# Patient Record
Sex: FEMALE | Race: OTHER
[De-identification: names, ages, dates, MRNs, and addresses within clinical notes are randomized per-mention and may not be internally consistent; named-entity substitution may affect disease eponyms.]

---

## 2018-06-24 ENCOUNTER — HOSPITAL ENCOUNTER (EMERGENCY)
Dept: HOSPITAL 74 - JER | Age: 58
Discharge: HOME | End: 2018-06-24
Payer: COMMERCIAL

## 2018-06-24 VITALS — SYSTOLIC BLOOD PRESSURE: 155 MMHG | DIASTOLIC BLOOD PRESSURE: 65 MMHG | TEMPERATURE: 98.2 F | HEART RATE: 66 BPM

## 2018-06-24 VITALS — BODY MASS INDEX: 31.3 KG/M2

## 2018-06-24 DIAGNOSIS — I10: ICD-10-CM

## 2018-06-24 DIAGNOSIS — R55: Primary | ICD-10-CM

## 2018-06-24 LAB
ALBUMIN SERPL-MCNC: 3.8 G/DL (ref 3.4–5)
ALP SERPL-CCNC: 110 U/L (ref 45–117)
ALT SERPL-CCNC: 83 U/L (ref 12–78)
ANION GAP SERPL CALC-SCNC: 9 MMOL/L (ref 8–16)
APPEARANCE UR: CLEAR
AST SERPL-CCNC: 25 U/L (ref 15–37)
BASOPHILS # BLD: 0.7 % (ref 0–2)
BILIRUB SERPL-MCNC: 0.4 MG/DL (ref 0.2–1)
BILIRUB UR STRIP.AUTO-MCNC: NEGATIVE MG/DL
BUN SERPL-MCNC: 13 MG/DL (ref 7–18)
CALCIUM SERPL-MCNC: 8.4 MG/DL (ref 8.5–10.1)
CHLORIDE SERPL-SCNC: 98 MMOL/L (ref 98–107)
CO2 SERPL-SCNC: 27 MMOL/L (ref 21–32)
COLOR UR: COLORLESS
CREAT SERPL-MCNC: 0.5 MG/DL (ref 0.55–1.02)
DEPRECATED RDW RBC AUTO: 13.7 % (ref 11.6–15.6)
EOSINOPHIL # BLD: 0.1 % (ref 0–4.5)
EPITH CASTS URNS QL MICRO: (no result) /HPF
GLUCOSE SERPL-MCNC: 130 MG/DL (ref 74–106)
HCT VFR BLD CALC: 41.1 % (ref 32.4–45.2)
HGB BLD-MCNC: 13.5 GM/DL (ref 10.7–15.3)
INR BLD: 1.05 (ref 0.82–1.09)
KETONES UR QL STRIP: NEGATIVE
LEUKOCYTE ESTERASE UR QL STRIP.AUTO: NEGATIVE
LYMPHOCYTES # BLD: 13 % (ref 8–40)
MAGNESIUM SERPL-MCNC: 1.6 MG/DL (ref 1.8–2.4)
MCH RBC QN AUTO: 27.5 PG (ref 25.7–33.7)
MCHC RBC AUTO-ENTMCNC: 32.9 G/DL (ref 32–36)
MCV RBC: 83.4 FL (ref 80–96)
MONOCYTES # BLD AUTO: 4.1 % (ref 3.8–10.2)
NEUTROPHILS # BLD: 82.1 % (ref 42.8–82.8)
NITRITE UR QL STRIP: NEGATIVE
PH UR: 6 [PH] (ref 5–8)
PLATELET # BLD AUTO: 283 K/MM3 (ref 134–434)
PMV BLD: 8.3 FL (ref 7.5–11.1)
POTASSIUM SERPLBLD-SCNC: 3.4 MMOL/L (ref 3.5–5.1)
PROT SERPL-MCNC: 7.1 G/DL (ref 6.4–8.2)
PROT UR QL STRIP: NEGATIVE
PROT UR QL STRIP: NEGATIVE
PT PNL PPP: 11.9 SEC (ref 9.7–13)
RBC # BLD AUTO: 4.92 M/MM3 (ref 3.6–5.2)
SODIUM SERPL-SCNC: 134 MMOL/L (ref 136–145)
SP GR UR: 1 (ref 1–1.03)
UROBILINOGEN UR STRIP-MCNC: NEGATIVE MG/DL (ref 0.2–1)
WBC # BLD AUTO: 9.4 K/MM3 (ref 4–10)

## 2018-06-24 PROCEDURE — 3E033GC INTRODUCTION OF OTHER THERAPEUTIC SUBSTANCE INTO PERIPHERAL VEIN, PERCUTANEOUS APPROACH: ICD-10-PCS | Performed by: EMERGENCY MEDICINE

## 2018-06-24 NOTE — PDOC
History of Present Illness





- General


Chief Complaint: Lightheaded


Stated Complaint: DIZZINESS


Time Seen by Provider: 06/24/18 14:36


History Source: Patient


Exam Limitations: No Limitations





- History of Present Illness


Initial Comments: 





06/24/18 14:45


Patient is a 58F with history of HTN here today complaining of "dizziness". She 

states that when she moves from a sitting position to a standing position she 

feels like she is going to faint. She states that she has no dizziness at rest 

or with movement of her head. Denies chest pain, denies shortness of breath. 

Patient states that she's had multiple episodes of watery diarrhea without 

blood. She denies abdominal pain, recent antibiotic use. Denies fevers, chills, 

nausea, vomiting.





Past History





- Past Medical History


Allergies/Adverse Reactions: 


 Allergies











Allergy/AdvReac Type Severity Reaction Status Date / Time


 


tetracycline [Tetracycline] Allergy   Verified 10/11/14 00:18











Home Medications: 


Ambulatory Orders





Losartan/Hydrochlorothiazide [Losartan-Hctz 50-12.5 mg Tab] 1 each PO DAILY 10/

11/14 








COPD: No


DVT: No


Other medical history: denies





- Immunization History


Immunization Up to Date: Yes





- Suicide/Smoking/Psychosocial Hx


Smoking History: Never smoked


Hx Alcohol Use: No


Drug/Substance Use Hx: No





**Review of Systems





- Review of Systems


Comments:: 





06/24/18 14:54


GENERAL/CONSTITUTIONAL: No fever or chills. +weakness.


HEAD, EYES, EARS, NOSE AND THROAT: No change in vision. No sore throat.


CARDIOVASCULAR: No chest pain or shortness of breath


RESPIRATORY: No cough, wheezing, or hemoptysis.


GASTROINTESTINAL: No nausea, vomiting, diarrhea or constipation.


GENITOURINARY: No dysuria, frequency, or change in urination.


MUSCULOSKELETAL: No joint or muscle swelling or pain. No neck or back pain.


SKIN: No rash


NEUROLOGIC: No headache, vertigo, loss of consciousness, or change in strength/

sensation.


ENDOCRINE: No increased thirst. No abnormal weight change


HEMATOLOGIC/LYMPHATIC: No anemia, easy bleeding, or history of blood clots.


ALLERGIC/IMMUNOLOGIC: No hives or skin allergy.








*Physical Exam





- Vital Signs


 Last Vital Signs











Temp Pulse Resp BP Pulse Ox


 


 98.3 F   70   20   160/77   100 


 


 06/24/18 14:31  06/24/18 14:31  06/24/18 14:31  06/24/18 14:31  06/24/18 14:31














- Physical Exam


Comments: 





06/24/18 14:54


GENERAL: Awake, alert, and fully oriented, in no acute distress


HEAD: No signs of trauma, normocephalic, atraumatic


EYES: PERRLA, EOMI, sclera anicteric, conjunctiva clear


ENT: Auricles normal inspection, hearing grossly normal, nares patent, 

oropharynx clear without


exudates. Dry mucosa


NECK: Normal ROM, supple, no lymphadenopathy, JVD, or masses


LUNGS: No distress, speaks full sentences, clear to auscultation bilaterally


HEART: Regular rate and rhythm, normal S1 and S2, no murmurs, rubs or gallops, 

peripheral pulses normal and equal bilaterally.


ABDOMEN: Soft, nontender, normoactive bowel sounds.  No guarding, no rebound.  

No masses


EXTREMITIES: Normal inspection, Normal range of motion, no edema.  No clubbing 

or cyanosis.


NEUROLOGICAL: Cranial nerves II through XII grossly intact.  Normal speech, no 

focal sensorimotor deficits


SKIN: Warm, Dry, normal turgor, no rashes or lesions noted.








ED Treatment Course





- LABORATORY


CBC & Chemistry Diagram: 


 06/24/18 14:56





 06/24/18 14:56





- RADIOLOGY


Radiology Studies Ordered: 














 Category Date Time Status


 


 CHEST X-RAY PORTABLE* [RAD] [facility] Radiology  06/24/18 14:42 Ordered














Medical Decision Making





- Medical Decision Making





06/24/18 14:55


Patient is 58F with history of HTN here today with near syncope. Vital signs 

stable and normal, will get orthostatics. Suspect patient is most likely 

dehydrated secondary to diarrhea, will evaluate for metabolic derangement, acs, 

arrhythmia, anemia. Will do EKG, CBC, CMP, CXR, mg, phos. Will treat with 

fluids and zofran.


06/24/18 15:53


 Laboratory Tests











  06/24/18 06/24/18





  14:56 15:10


 


WBC  9.4 


 


Hgb  13.5 


 


Plt Count  283 


 


Urine Nitrite   Negative


 


Ur Leukocyte Esterase   Negative








CBC normal, CMP reassuring, UA negative for infection.


06/24/18 15:53


CXR shows no acute pathology.





EKG shows normal sinus rhythm with rate of 68. No st elevations/depressions. No 

significant t wave abnormalities. RBBB morphology, but no QRS widening. Normal 

intervals. 


06/24/18 17:27


Patient reassessed, sitting up comfortably and walking normally. Will discharge 

with return precautions and pcp follow up.





*DC/Admit/Observation/Transfer


Diagnosis at time of Disposition: 


 Near syncope








- Discharge Dispostion


Disposition: HOME


Condition at time of disposition: Good


Decision to Admit order: No





- Referrals


Referrals: 


Korina Auguste [Primary Care Provider] - 





- Patient Instructions


Printed Discharge Instructions:  DI for Syncope in Adults (Fainting)


Additional Instructions: 


Please return if you have any new, worsening or concerning symptoms.





Please follow up with your primary care physciian this week.





- Post Discharge Activity

## 2018-06-24 NOTE — PDOC
Attending Attestation





- Resident


Resident Name: DoyleDonald carrasco





- ED Attending Attestation


I have performed the following: I have examined & evaluated the patient, The 

case was reviewed & discussed with the resident, I agree w/resident's findings 

& plan, Exceptions are as noted





- HPI


HPI: 





06/24/18 15:39


"Patient is a 58 F, with PMHx of HTN, who presents to the ED for 

lightheadedness. Pt states that her symptoms began today. She describes feeling 

woozy, as if she is going to faint, but she has not lost consciousness. Pt 

denies CP/SOB/palpitations. Denies F/C. Denies room-spinning sensation. She 

states that she does not "feel herself" but cannot describe the sensation she 

is having. Pt denies HA.  Denies N/V/D/abdominal pain. Denies weakness/numbness 

in any extremity. Denies unsteadiness while walking. 


"





- Physicial Exam


PE: 





06/24/18 15:41


"GENERAL: Awake, alert, and fully oriented, in no acute distress.


HEAD: No signs of trauma


EYES: PERRLA, EOMI, sclera anicteric, conjunctiva clear


ENT: Auricles normal inspection, hearing grossly normal, nares patent, 

oropharynx clear without exudates. Moist mucosa


NECK: Nontender, no stepoffs, Normal ROM, supple, no lymphadenopathy, JVD, or 

masses


LUNGS: Breath sounds equal, clear to auscultation bilaterally.  No wheezes, and 

no crackles


HEART: Regular rate and rhythm, normal S1 and S2, no murmurs, rubs or gallops


ABDOMEN: Soft, nontender, normoactive bowel sounds.  No guarding, no rebound.  

No masses


EXTREMITIES: Normal range of motion, no edema.  No clubbing or cyanosis. No 

cords, erythema, or tenderness


NEUROLOGICAL: Cranial nerves II through XII intact. 5/5 strength and sensation 

in all extremities, Normal speech, normal gait, normal cerebellar function


SKIN: Warm, Dry, normal turgor, no rashes or lesions noted.


"





- Medical Decision Making





06/24/18 15:41


58 F with lightheadedness x 1 day. Pt with no neuro deficits to suggest primary 

neuro process. No CP/SOB but will r/o ACS with trop and EKG. No fever but will 

evaluate for infectious process.


- Labs, UA, CXR


- IVF





06/24/18 16:23


Labs unremarkable


Pt reassessed - now feels much better s/p IVF


Pt ambulatory in ED with steady gait.


Exam continues to be normal, non-focal neuro exam, clear lungs, benign abdomen.


Pt is well appearing, with normal vitals. Clinically stable for DC at this time.


I discussed the physical exam findings, ancillary test results and final 

diagnoses with the patient. I answered all of the patient's questions. The 

patient was satisfied with the care received and felt comfortable with the 

discharge plan and treatment plan.  The patient agrees to follow up with the 

primary care physician within 24-72 hours.

## 2018-06-25 NOTE — EKG
Test Reason : 

Blood Pressure : ***/*** mmHG

Vent. Rate : 068 BPM     Atrial Rate : 068 BPM

   P-R Int : 186 ms          QRS Dur : 108 ms

    QT Int : 432 ms       P-R-T Axes : 021 -14 035 degrees

   QTc Int : 459 ms

 

NORMAL SINUS RHYTHM

INCOMPLETE RIGHT BUNDLE BRANCH BLOCK

LEFT VENTRICULAR HYPERTROPHY WITH REPOLARIZATION ABNORMALITY

ABNORMAL ECG

NO PREVIOUS ECGS AVAILABLE

Confirmed by MADHAVI LOPEZ MD ([id number]) on 6/25/2018 12:33:22 PM

 

Referred By:             Confirmed By:MADHAVI LOPEZ MD